# Patient Record
Sex: FEMALE | Race: BLACK OR AFRICAN AMERICAN | NOT HISPANIC OR LATINO | Employment: FULL TIME | URBAN - METROPOLITAN AREA
[De-identification: names, ages, dates, MRNs, and addresses within clinical notes are randomized per-mention and may not be internally consistent; named-entity substitution may affect disease eponyms.]

---

## 2024-01-22 ENCOUNTER — OFFICE VISIT (OUTPATIENT)
Dept: URGENT CARE | Facility: CLINIC | Age: 54
End: 2024-01-22
Payer: COMMERCIAL

## 2024-01-22 VITALS
TEMPERATURE: 99 F | DIASTOLIC BLOOD PRESSURE: 101 MMHG | BODY MASS INDEX: 31.89 KG/M2 | OXYGEN SATURATION: 97 % | HEIGHT: 63 IN | RESPIRATION RATE: 18 BRPM | SYSTOLIC BLOOD PRESSURE: 155 MMHG | HEART RATE: 80 BPM | WEIGHT: 180 LBS

## 2024-01-22 DIAGNOSIS — R10.13 EPIGASTRIC PAIN: ICD-10-CM

## 2024-01-22 DIAGNOSIS — R05.1 ACUTE COUGH: ICD-10-CM

## 2024-01-22 DIAGNOSIS — K29.70 GASTRITIS WITHOUT BLEEDING, UNSPECIFIED CHRONICITY, UNSPECIFIED GASTRITIS TYPE: Primary | ICD-10-CM

## 2024-01-22 PROCEDURE — 71046 X-RAY EXAM CHEST 2 VIEWS: CPT | Mod: S$GLB,,, | Performed by: RADIOLOGY

## 2024-01-22 PROCEDURE — 99203 OFFICE O/P NEW LOW 30 MIN: CPT | Mod: S$GLB,,, | Performed by: NURSE PRACTITIONER

## 2024-01-22 RX ORDER — IPRATROPIUM BROMIDE 42 UG/1
SPRAY, METERED NASAL
COMMUNITY
Start: 2024-01-09

## 2024-01-22 RX ORDER — OMEPRAZOLE 20 MG/1
20 CAPSULE, DELAYED RELEASE ORAL DAILY
Qty: 14 CAPSULE | Refills: 0 | Status: SHIPPED | OUTPATIENT
Start: 2024-01-22 | End: 2024-02-05

## 2024-01-22 RX ORDER — BENZONATATE 200 MG/1
200 CAPSULE ORAL
COMMUNITY
Start: 2024-01-09

## 2024-01-22 RX ORDER — FAMOTIDINE 20 MG/1
20 TABLET, FILM COATED ORAL 2 TIMES DAILY
Qty: 28 TABLET | Refills: 0 | Status: SHIPPED | OUTPATIENT
Start: 2024-01-22 | End: 2024-02-05

## 2024-01-22 RX ORDER — AMOXICILLIN AND CLAVULANATE POTASSIUM 875; 125 MG/1; MG/1
1 TABLET, FILM COATED ORAL 2 TIMES DAILY
COMMUNITY
Start: 2024-01-09

## 2024-01-22 NOTE — PROGRESS NOTES
"Subjective:      Patient ID: Lavon Alvarez is a 53 y.o. female.    Vitals:  height is 5' 3" (1.6 m) and weight is 81.6 kg (180 lb). Her oral temperature is 98.6 °F (37 °C). Her blood pressure is 155/101 (abnormal) and her pulse is 80. Her respiration is 18 and oxygen saturation is 97%.     Chief Complaint: Abdominal Pain    Patient had been sick with cold issues since November 2023. On January 8, 2024 she was diagnosed with pneumonia. Patient had abdomen pain before and while taking antibiotics. She finished her antibiotics but her abdomen pain continues. Patient had a little reflux that came up with a little clear liquid. Patient has been getting light headed 2 to 3 times per week. Patient says when she drink any type of alcohol, she starts to feel really bad abd pain. Patient had this type of pain in 2007 but they couldn't figure out the problem. She was told BC powder could be destroying the linning of her stomach.  Provider note begins below    Patient reports long-term epigastric discomfort.  She states he has been or consistent over the last few months.  The symptoms are exacerbated with alcohol intake.  She states it is worse in more consistent.  She has had an increase in caffeine intake.  Denies any blood or mucus in stools.  Denies any constipation or diarrhea.  Denies any patient states she was recently on Augmentin to treat pneumonia.  She has felt lightheaded dizzy times a week, Since her upper respiratory symptoms started in November.  She is concerned her liver it might be affected.  She will be here until February.      Abdominal Pain  This is a new problem. The current episode started 1 to 4 weeks ago. The onset quality is gradual. The problem occurs intermittently. The problem has been gradually worsening. The pain is located in the LLQ and epigastric region. The pain is at a severity of 9/10. The pain is severe. The quality of the pain is sharp and a sensation of fullness. The abdominal pain does " not radiate. Associated symptoms include nausea. Pertinent negatives include no belching, constipation, diarrhea, fever, hematochezia or vomiting. Her past medical history is significant for abdominal surgery (appendectomy). Patient's medical history does not include kidney stones.       Constitution: Negative for sweating, fatigue and fever.   Cardiovascular:  Negative for chest pain and sob on exertion.   Respiratory:  Negative for cough and shortness of breath.    Gastrointestinal:  Positive for abdominal pain, history of abdominal surgery (appendectomy) and nausea. Negative for abdominal bloating, vomiting, constipation, diarrhea, bright red blood in stool and dark colored stools.      Objective:     Physical Exam   Constitutional: She is oriented to person, place, and time.   HENT:   Head: Normocephalic and atraumatic.   Cardiovascular: Normal rate.   Pulmonary/Chest: Effort normal. No respiratory distress.   Abdominal: Normal appearance. She exhibits no distension. Soft. There is no splenomegaly or hepatomegaly. There is abdominal tenderness in the epigastric area. There is no rebound, no guarding, no tenderness at McBurney's point, negative Foss's sign, no left CVA tenderness, negative Rovsing's sign, negative psoas sign, no right CVA tenderness and negative obturator sign.   Neurological: She is alert and oriented to person, place, and time.   Skin: Skin is warm and dry.   Psychiatric: Her behavior is normal. Mood normal.         XR CHEST PA AND LATERAL    Result Date: 1/22/2024  EXAMINATION: XR CHEST PA AND LATERAL CLINICAL HISTORY: Acute cough TECHNIQUE: PA and lateral views of the chest were performed. COMPARISON: None FINDINGS: Heart size and mediastinal contour are normal.  Lungs are clear.  No pneumonia or pleural fluid is detected.  A skeletal structures show S shaped scoliosis in the thoracolumbar spine.     No acute cardiopulmonary disease Electronically signed by: Efraín Gaspar MD  Date:    01/22/2024 Time:    15:01       Assessment:     1. Gastritis without bleeding, unspecified chronicity, unspecified gastritis type    2. Acute cough    3. Epigastric pain        Plan:   Gastritis-Pepcid and Prilosec for 2 weeks.  Follow up with GI when you return home  Chest x-ray.  Previous provider requested she have this done to evaluate for clearance of pneumonia.  No pneumonia on x-ray  ED precautions  Abdominal ultrasound ordered for abdominal pain         Gastritis without bleeding, unspecified chronicity, unspecified gastritis type  -     famotidine (PEPCID) 20 MG tablet; Take 1 tablet (20 mg total) by mouth 2 (two) times daily. for 14 days  Dispense: 28 tablet; Refill: 0  -     omeprazole (PRILOSEC) 20 MG capsule; Take 1 capsule (20 mg total) by mouth once daily. for 14 days  Dispense: 14 capsule; Refill: 0    Acute cough  -     XR CHEST PA AND LATERAL; Future; Expected date: 01/22/2024  -     famotidine (PEPCID) 20 MG tablet; Take 1 tablet (20 mg total) by mouth 2 (two) times daily. for 14 days  Dispense: 28 tablet; Refill: 0  -     omeprazole (PRILOSEC) 20 MG capsule; Take 1 capsule (20 mg total) by mouth once daily. for 14 days  Dispense: 14 capsule; Refill: 0    Epigastric pain  -     US Abdomen Complete; Future; Expected date: 01/22/2024

## 2024-01-24 ENCOUNTER — HOSPITAL ENCOUNTER (OUTPATIENT)
Dept: RADIOLOGY | Facility: HOSPITAL | Age: 54
Discharge: HOME | End: 2024-01-24
Attending: NURSE PRACTITIONER
Payer: COMMERCIAL

## 2024-01-24 ENCOUNTER — HOSPITAL ENCOUNTER (OUTPATIENT)
Dept: RADIOLOGY | Facility: HOSPITAL | Age: 54
Discharge: HOME | End: 2024-01-24
Payer: COMMERCIAL

## 2024-01-24 ENCOUNTER — TELEPHONE (OUTPATIENT)
Dept: URGENT CARE | Facility: CLINIC | Age: 54
End: 2024-01-24
Payer: COMMERCIAL

## 2024-01-24 VITALS — HEIGHT: 63 IN | BODY MASS INDEX: 31.89 KG/M2 | WEIGHT: 180 LBS

## 2024-01-24 DIAGNOSIS — R10.13 EPIGASTRIC PAIN: ICD-10-CM

## 2024-01-24 DIAGNOSIS — Z12.31 ENCOUNTER FOR SCREENING MAMMOGRAM FOR BREAST CANCER: ICD-10-CM

## 2024-01-24 DIAGNOSIS — K76.0 HEPATIC STEATOSIS: ICD-10-CM

## 2024-01-24 DIAGNOSIS — N28.1 RENAL CYST, RIGHT: Primary | ICD-10-CM

## 2024-01-24 PROCEDURE — 76700 US EXAM ABDOM COMPLETE: CPT | Mod: TC

## 2024-01-24 PROCEDURE — 77063 BREAST TOMOSYNTHESIS BI: CPT | Mod: 26,,, | Performed by: RADIOLOGY

## 2024-01-24 PROCEDURE — 77067 SCR MAMMO BI INCL CAD: CPT | Mod: TC

## 2024-01-24 PROCEDURE — 77067 SCR MAMMO BI INCL CAD: CPT | Mod: 26,,, | Performed by: RADIOLOGY

## 2024-01-24 PROCEDURE — 76700 US EXAM ABDOM COMPLETE: CPT | Mod: 26,,, | Performed by: RADIOLOGY

## 2024-01-24 NOTE — TELEPHONE ENCOUNTER
US Abdomen shows Mild hepatomegaly with greater than 5% steatosis. Right renal cyst (~ 2 cm)  NP called pt and discussed findings.  Referral to GI and Nephrology ordered  Referral ordered.  Call [phone] to schedule appt

## 2024-02-01 ENCOUNTER — TELEPHONE (OUTPATIENT)
Dept: URGENT CARE | Facility: CLINIC | Age: 54
End: 2024-02-01
Payer: COMMERCIAL

## 2024-02-01 ENCOUNTER — PATIENT MESSAGE (OUTPATIENT)
Dept: DERMATOLOGY | Facility: CLINIC | Age: 54
End: 2024-02-01

## 2024-02-01 ENCOUNTER — OFFICE VISIT (OUTPATIENT)
Dept: DERMATOLOGY | Facility: CLINIC | Age: 54
End: 2024-02-01
Payer: COMMERCIAL

## 2024-02-01 DIAGNOSIS — L65.8 TRACTION ALOPECIA: ICD-10-CM

## 2024-02-01 DIAGNOSIS — L29.9 SCALP ITCH: Primary | ICD-10-CM

## 2024-02-01 DIAGNOSIS — L65.9 HAIR LOSS: ICD-10-CM

## 2024-02-01 DIAGNOSIS — Z76.89 ENCOUNTER FOR SKIN CARE: ICD-10-CM

## 2024-02-01 PROCEDURE — 99204 OFFICE O/P NEW MOD 45 MIN: CPT | Mod: 95,,, | Performed by: DERMATOLOGY

## 2024-02-01 PROCEDURE — 1159F MED LIST DOCD IN RCRD: CPT | Mod: CPTII,95,, | Performed by: DERMATOLOGY

## 2024-02-01 PROCEDURE — 1160F RVW MEDS BY RX/DR IN RCRD: CPT | Mod: CPTII,95,, | Performed by: DERMATOLOGY

## 2024-02-01 NOTE — PROGRESS NOTES
The patient location is: car  The chief complaint leading to consultation is: hair loss scalp.  No tx.    Visit type: audiovisual    Face to Face time with patient: 4.5 m  7 minutes of total time spent on the encounter, which includes face to face time and non-face to face time preparing to see the patient (eg, review of tests), Obtaining and/or reviewing separately obtained history, Documenting clinical information in the electronic or other health record, Independently interpreting results (not separately reported) and communicating results to the patient/family/caregiver, or Care coordination (not separately reported).         Each patient to whom he or she provides medical services by telemedicine is:  (1) informed of the relationship between the physician and patient and the respective role of any other health care provider with respect to management of the patient; and (2) notified that he or she may decline to receive medical services by telemedicine and may withdraw from such care at any time.    Notes:     Subjective:      Patient ID:  Lavon Alvarez is a 53 y.o. female who presents for No chief complaint on file.    HPI    Review of Systems   Constitutional:  Negative for fever.   HENT:  Negative for sore throat.    Respiratory:  Negative for cough.    Skin:  Positive for dry skin (scalp).       Objective:   Physical Exam   Constitutional: She appears well-developed and well-nourished.   Neurological: She is alert and oriented to person, place, and time.   Psychiatric: She has a normal mood and affect.        Diagram Legend     Erythematous scaling macule/papule c/w actinic keratosis       Vascular papule c/w angioma      Pigmented verrucoid papule/plaque c/w seborrheic keratosis      Yellow umbilicated papule c/w sebaceous hyperplasia      Irregularly shaped tan macule c/w lentigo     1-2 mm smooth white papules consistent with Milia      Movable subcutaneous cyst with punctum c/w epidermal inclusion cyst       Subcutaneous movable cyst c/w pilar cyst      Firm pink to brown papule c/w dermatofibroma      Pedunculated fleshy papule(s) c/w skin tag(s)      Evenly pigmented macule c/w junctional nevus     Mildly variegated pigmented, slightly irregular-bordered macule c/w mildly atypical nevus      Flesh colored to evenly pigmented papule c/w intradermal nevus       Pink pearly papule/plaque c/w basal cell carcinoma      Erythematous hyperkeratotic cursted plaque c/w SCC      Surgical scar with no sign of skin cancer recurrence      Open and closed comedones      Inflammatory papules and pustules      Verrucoid papule consistent consistent with wart     Erythematous eczematous patches and plaques     Dystrophic onycholytic nail with subungual debris c/w onychomycosis     Umbilicated papule    Erythematous-base heme-crusted tan verrucoid plaque consistent with inflamed seborrheic keratosis     Erythematous Silvery Scaling Plaque c/w Psoriasis     See annotation      Assessment / Plan:        Scalp itch  Instructed patient to use plain Head and Shoulders shampoo regularly for soaks lasting at least 5 minutes or more to the scalp and or face as directed.  Regular shampoo and conditioner afterward is fine.  For suspected allergy cases, rinse away from the face and body discussed.  15 m q wk.  Proper application of medications and or care for affected area(s) and condition(s) reviewed.    Traction alopecia  Discussed with patient the etiology and pathogenesis of the disease or skin lesion(s) and possible treatments and aggravators.    Reviewed with patient etiology of traction and the need for looser hairstyles with less tension.    Hair loss  -     Zinc; Future; Expected date: 02/01/2024  -     Vitamin B12; Future; Expected date: 02/01/2024  -     Ferritin; Future; Expected date: 02/01/2024  -     TSH; Future; Expected date: 02/01/2024  -     Calcitriol; Future; Expected date: 02/01/2024  Previous [facility] labs and or  records and notes reviewed and considered for their impact on our clinical decision making today.  Discussed with patient the need for laboratory work up for further evaluation.  Discussed that such investigation may not be helpful.  Discussed with patient the need for laboratory work up for further evaluation.  Discussed that such investigation may not be helpful.  Reviewed with patient different treatment options and associated risks.  Avoid harsh chemicals, relaxers, hair coloring, and anything irritating to the scalp.  Less is more.  Less product usage for the scalp is for the best.  Reviewed with patient to eat protein daily, get labs done, wash with recommended shampoo or soap for the scalp, avoid hair coloring and chemicals and hot treatments, and potentially consider topical 5% minoxidil.  Avoid hot water.  Can try saw palmetto 500 mg to 1000 mg per day.  Prefer organic version.    Encounter for skin care  No hot water bathing reviewed.             Follow up if symptoms worsen or fail to improve, for Post labs.

## 2024-02-01 NOTE — PATIENT INSTRUCTIONS
40 grams protein per day.    Avoid harsh chemicals, relaxers, hair coloring, and anything irritating to the scalp.  Less is more.  Less product usage for the scalp is for the best.      Reviewed with patient etiology of traction and the need for looser hairstyles with less tension.      Instructed patient to use plain Head and Shoulders shampoo regularly for dry or damp soaks lasting at least 15 minutes or more to the scalp and or face as directed.  Regular shampoo and conditioner afterward is fine.  For suspected allergy cases, rinse away from the face and body discussed.      Can try saw palmetto 1000 mg per day.  Prefer organic version.

## 2024-02-01 NOTE — TELEPHONE ENCOUNTER
NP returned phone call to pt.  Pt had a specific question for the provider who saw the pt in the clinic.   A message was sent to that provider.

## 2024-02-02 ENCOUNTER — TELEPHONE (OUTPATIENT)
Dept: DERMATOLOGY | Facility: CLINIC | Age: 54
End: 2024-02-02
Payer: COMMERCIAL

## 2024-02-02 NOTE — TELEPHONE ENCOUNTER
Scheduled pt to have labs collected Monday at the [facility]        ----- Message from Luis Angel Merrill MD sent at 2/1/2024  9:19 AM [facility] -----  Please sched labs for next week

## 2024-02-04 ENCOUNTER — PATIENT MESSAGE (OUTPATIENT)
Dept: DERMATOLOGY | Facility: CLINIC | Age: 54
End: 2024-02-04
Payer: COMMERCIAL

## 2024-02-05 ENCOUNTER — LAB VISIT (OUTPATIENT)
Dept: LAB | Facility: HOSPITAL | Age: 54
End: 2024-02-05
Attending: DERMATOLOGY
Payer: COMMERCIAL

## 2024-02-05 ENCOUNTER — TELEPHONE (OUTPATIENT)
Dept: DERMATOLOGY | Facility: CLINIC | Age: 54
End: 2024-02-05
Payer: COMMERCIAL

## 2024-02-05 DIAGNOSIS — L65.9 HAIR LOSS: ICD-10-CM

## 2024-02-05 LAB
FERRITIN SERPL-MCNC: 292 NG/ML (ref 20–300)
TSH SERPL DL<=0.005 MIU/L-ACNC: 1 UIU/ML (ref 0.4–4)
VIT B12 SERPL-MCNC: 778 PG/ML (ref 210–950)

## 2024-02-05 PROCEDURE — 84630 ASSAY OF ZINC: CPT | Performed by: DERMATOLOGY

## 2024-02-05 PROCEDURE — 82652 VIT D 1 25-DIHYDROXY: CPT | Performed by: DERMATOLOGY

## 2024-02-05 PROCEDURE — 82728 ASSAY OF FERRITIN: CPT | Performed by: DERMATOLOGY

## 2024-02-05 PROCEDURE — 84443 ASSAY THYROID STIM HORMONE: CPT | Performed by: DERMATOLOGY

## 2024-02-05 PROCEDURE — 36415 COLL VENOUS BLD VENIPUNCTURE: CPT | Performed by: DERMATOLOGY

## 2024-02-05 PROCEDURE — 82607 VITAMIN B-12: CPT | Performed by: DERMATOLOGY

## 2024-02-05 NOTE — TELEPHONE ENCOUNTER
Spoke with the pt.unfortunately NATHALY is out of the office today. I suggested that she call her PCP  being that she need the orders placed asap.      Faustina          ----- Message from Mel Levy sent at 2/5/2024 11:00 AM CST -----  Regarding: Labs  Contact: Pt  [phone]  Pt is calling ask can she get the full panel for labs states they took enough blood they just needs orders sent over please call

## 2024-02-09 ENCOUNTER — PATIENT MESSAGE (OUTPATIENT)
Dept: DERMATOLOGY | Facility: CLINIC | Age: 54
End: 2024-02-09
Payer: COMMERCIAL

## 2024-02-09 LAB
1,25(OH)2D3 SERPL-MCNC: 85 PG/ML (ref 20–79)
ZINC SERPL-MCNC: 76 UG/DL (ref 60–130)

## 2024-02-14 ENCOUNTER — OFFICE VISIT (OUTPATIENT)
Dept: DERMATOLOGY | Facility: CLINIC | Age: 54
End: 2024-02-14
Payer: COMMERCIAL

## 2024-02-14 ENCOUNTER — PATIENT MESSAGE (OUTPATIENT)
Dept: DERMATOLOGY | Facility: CLINIC | Age: 54
End: 2024-02-14

## 2024-02-14 DIAGNOSIS — H02.729 HYPOTRICHOSIS OF EYELID, UNSPECIFIED LATERALITY: ICD-10-CM

## 2024-02-14 DIAGNOSIS — L65.9 HAIR LOSS: Primary | ICD-10-CM

## 2024-02-14 DIAGNOSIS — L63.9 ALOPECIA AREATA: ICD-10-CM

## 2024-02-14 PROCEDURE — G2211 COMPLEX E/M VISIT ADD ON: HCPCS | Mod: 95,,, | Performed by: DERMATOLOGY

## 2024-02-14 PROCEDURE — 1160F RVW MEDS BY RX/DR IN RCRD: CPT | Mod: CPTII,95,, | Performed by: DERMATOLOGY

## 2024-02-14 PROCEDURE — 99214 OFFICE O/P EST MOD 30 MIN: CPT | Mod: 95,,, | Performed by: DERMATOLOGY

## 2024-02-14 PROCEDURE — 1159F MED LIST DOCD IN RCRD: CPT | Mod: CPTII,95,, | Performed by: DERMATOLOGY

## 2024-02-14 RX ORDER — BIMATOPROST 3 UG/ML
SOLUTION TOPICAL
Qty: 3 ML | Refills: 3 | Status: SHIPPED | OUTPATIENT
Start: 2024-02-14

## 2024-02-14 RX ORDER — CLOBETASOL PROPIONATE 0.46 MG/ML
SOLUTION TOPICAL 2 TIMES DAILY
Qty: 50 ML | Refills: 1 | Status: SHIPPED | OUTPATIENT
Start: 2024-02-14

## 2024-02-14 NOTE — PROGRESS NOTES
The patient location is: home  The chief complaint leading to consultation is: thin eyelashes x 2 years or more.  No tx.  Also co smooth hair loss areas of the scalp.    Visit type: audiovisual    Face to Face time with patient: 8 m  12 minutes of total time spent on the encounter, which includes face to face time and non-face to face time preparing to see the patient (eg, review of tests), Obtaining and/or reviewing separately obtained history, Documenting clinical information in the electronic or other health record, Independently interpreting results (not separately reported) and communicating results to the patient/family/caregiver, or Care coordination (not separately reported).         Each patient to whom he or she provides medical services by telemedicine is:  (1) informed of the relationship between the physician and patient and the respective role of any other health care provider with respect to management of the patient; and (2) notified that he or she may decline to receive medical services by telemedicine and may withdraw from such care at any time.    Notes:     Subjective:      Patient ID:  Lavon Alvarez is a 53 y.o. female who presents for No chief complaint on file.    HPI    Review of Systems   Constitutional:  Negative for fever.   HENT:  Negative for sore throat.    Respiratory:  Negative for cough.        Objective:   Physical Exam   Constitutional: She appears well-developed and well-nourished.   Neurological: She is alert and oriented to person, place, and time.   Psychiatric: She has a normal mood and affect.        Diagram Legend     Erythematous scaling macule/papule c/w actinic keratosis       Vascular papule c/w angioma      Pigmented verrucoid papule/plaque c/w seborrheic keratosis      Yellow umbilicated papule c/w sebaceous hyperplasia      Irregularly shaped tan macule c/w lentigo     1-2 mm smooth white papules consistent with Milia      Movable subcutaneous cyst with punctum c/w  epidermal inclusion cyst      Subcutaneous movable cyst c/w pilar cyst      Firm pink to brown papule c/w dermatofibroma      Pedunculated fleshy papule(s) c/w skin tag(s)      Evenly pigmented macule c/w junctional nevus     Mildly variegated pigmented, slightly irregular-bordered macule c/w mildly atypical nevus      Flesh colored to evenly pigmented papule c/w intradermal nevus       Pink pearly papule/plaque c/w basal cell carcinoma      Erythematous hyperkeratotic cursted plaque c/w SCC      Surgical scar with no sign of skin cancer recurrence      Open and closed comedones      Inflammatory papules and pustules      Verrucoid papule consistent consistent with wart     Erythematous eczematous patches and plaques     Dystrophic onycholytic nail with subungual debris c/w onychomycosis     Umbilicated papule    Erythematous-base heme-crusted tan verrucoid plaque consistent with inflamed seborrheic keratosis     Erythematous Silvery Scaling Plaque c/w Psoriasis     See annotation                        Assessment / Plan:        Hair loss  Start saw.  Can try saw palmetto 500 mg to 1000 mg per day.  Prefer organic version.    Hypotrichosis of eyelid, unspecified laterality  -     bimatoprost (LATISSE) 0.03 % ophthalmic solution; Place one drop on applicator and apply evenly along the skin of the upper eyelid at base of eyelashes once daily at bedtime; repeat procedure for second eye (use a clean applicator).  Dispense: 3 mL; Refill: 3  Pt reports 2-3 yr hx.  Reviewed with patient different treatment options and associated risks.  Proper application of medications and or care for affected area(s) and condition(s) reviewed.  Chronic nature of this condition discussed with patient.  Karen HOLLEY.  Discussed side affects of latisse including: Dry eyes, Feeling that something is in the eye, Burning, eyelash changes like dark eyelashes, along with darkening of eyelid skin and possible eye color    Alopecia areata  -      clobetasoL (TEMOVATE) 0.05 % external solution; Apply topically 2 (two) times daily. Focally to bald patches of scalp  Dispense: 50 mL; Refill: 1  Patient to provide pictures for better documentation.  Reviewed with patient different treatment options and associated risks.  Proper application of medications and or care for affected area(s) and condition(s) reviewed.  Poss AA per hx.  Discussed with patient the etiology and pathogenesis of the disease or skin lesion(s) and possible treatments and aggravators.    Patient needs in person evaluation to better ascertain nature of lesion or rash.  Photos from virtual visit are not clear enough for interpretation.             Follow up in about 2 months (around 4/14/2024).

## 2024-02-20 DIAGNOSIS — R92.8 ABNORMAL MAMMOGRAM OF LEFT BREAST: Primary | ICD-10-CM

## 2024-03-05 DIAGNOSIS — N28.1 RENAL CYST: Primary | ICD-10-CM

## 2024-03-15 ENCOUNTER — TELEPHONE (OUTPATIENT)
Dept: NEPHROLOGY | Facility: CLINIC | Age: 54
End: 2024-03-15
Payer: COMMERCIAL